# Patient Record
Sex: MALE | Race: WHITE | Employment: FULL TIME | URBAN - METROPOLITAN AREA
[De-identification: names, ages, dates, MRNs, and addresses within clinical notes are randomized per-mention and may not be internally consistent; named-entity substitution may affect disease eponyms.]

---

## 2019-07-15 NOTE — TELEPHONE ENCOUNTER
FUTURE VISIT INFORMATION      FUTURE VISIT INFORMATION:    Date: 7.16.19    Time: 3:00 pm    Location:  DermSurg  REFERRAL INFORMATION:    Referring provider:  Dr. Wilfrido Cordero    Referring providers clinic:  [facility]    Reason for visit/diagnosis:  SCCx1 left cheek    RECORDS REQUESTED FROM:       Clinic name Comments Records Status Imaging Status   [facility] 6.26.19 - Dr. Lai In [facility]

## 2019-07-16 ENCOUNTER — OFFICE VISIT (OUTPATIENT)
Dept: DERMATOLOGY | Facility: CLINIC | Age: 44
End: 2019-07-16
Payer: COMMERCIAL

## 2019-07-16 ENCOUNTER — PRE VISIT (OUTPATIENT)
Dept: DERMATOLOGY | Facility: CLINIC | Age: 44
End: 2019-07-16

## 2019-07-16 DIAGNOSIS — D04.30 SQUAMOUS CELL CARCINOMA IN SITU (SCCIS) OF SKIN OF FACE: Primary | ICD-10-CM

## 2019-07-16 ASSESSMENT — PAIN SCALES - GENERAL: PAINLEVEL: NO PAIN (0)

## 2019-07-16 NOTE — PROGRESS NOTES
[facility] Dermatology Note      Dermatology Problem List:  1. SCC, invasive, left lateral lower eyelid MMS date pending    Encounter Date: Jul 16, 2019    CC:  Chief Complaint   Patient presents with     Derm Problem     L cheek SCC         History of Present Illness:  Mr. Marc Pozo is a 44 year old male who presents in consultation from Wilfrido Lai DO, from the [facility] for possible mohs micrographic surgery. Last fall he had a blackhead on the left cheek near the eye. He squeezed on it and he thinks it subsequently became infected. He was seen twice and treated with courses of antibiotics without improvement. He then saw Dr. Lai and had a biopsy showing squamous cell carcinoma with positive peripheral and deep margins. He is otherwise doing well. No additional complaints.       Past Medical History:   There is no problem list on file for this patient.    History reviewed. No pertinent past medical history.  History reviewed. No pertinent surgical history.    Social History:  Patient reports that he has been smoking.  He uses smokeless tobacco.    Family History:  History reviewed. No pertinent family history.    Medications:  No current outpatient medications on file.       No Known Allergies    Review of Systems:  -Constitutional: Otherwise feeling well today, in usual state of health.  -Skin: As above in HPI. No additional skin concerns.    Physical exam:  Vitals: There were no vitals taken for this visit.  GEN: This is a well developed, well-nourished male in no acute distress, in a pleasant mood.    SKIN: Focused examination of the face was performed.  -2 cm freely mobile keratotic tumor on the right lateral lower eyelid.   - No palpable adenopathy of the head and neck  -No other lesions of concern on areas examined.       Impression/Plan:  1. Invasive squamous cell carcinoma, right lateral lower eyelid    Risks, benefits, and process of Mohs micrographic  surgery were discussed including possibility of damage to surrounding anatomical structures and infection. Patient is comfortable proceeding with the surgery; consent form was obtained. He will be scheduled at his convenience.    He [place] away and may want to observe tia-orbital swelling afterwards to make sure he is ok to drive      CC River Lai MD  [facility]  [address] [place] [address] on close of this encounter.    Follow-up for MMS at his convenience        Photo placed into patients chart note for further reference.         Staff Involved:  Scribe/Staff      Scribe Disclosure:   I, Jonathan Davis, am serving as a scribe to document services personally performed by Dr. Andrew Villalta, based on data collection and the provider's statements to me.     Attending Attestation  I attest that the Scribe recorded the interview and exam that I personally performed.  I have reviewed the note and edited it as necessary.    Andrew Villalta M.D.  Professor  Director of Dermatologic Surgery  Department of Dermatology  [facility]

## 2019-07-16 NOTE — LETTER
7/16/2019       RE: Marc Pozo  [address]  [facility] [address]     Dear Colleague,    Thank you for referring your patient, Marc Pozo, to the [facility] DERMATOLOGIC SURGERY at [facility]. Please see a copy of my visit note below.    [facility] Dermatology Note      Dermatology Problem List:  1. SCC, invasive, left lateral lower eyelid MMS date pending    Encounter Date: Jul 16, 2019    CC:  Chief Complaint   Patient presents with     Derm Problem     L cheek SCC         History of Present Illness:  Mr. Marc Pozo is a 44 year old male who presents in consultation from Wilfrido Lai DO, from the [facility] for possible mohs micrographic surgery. Last fall he had a blackhead on the left cheek near the eye. He squeezed on it and he thinks it subsequently became infected. He was seen twice and treated with courses of antibiotics without improvement. He then saw Dr. Lai and had a biopsy showing squamous cell carcinoma with positive peripheral and deep margins. He is otherwise doing well. No additional complaints.       Past Medical History:   There is no problem list on file for this patient.    History reviewed. No pertinent past medical history.  History reviewed. No pertinent surgical history.    Social History:  Patient reports that he has been smoking.  He uses smokeless tobacco.    Family History:  History reviewed. No pertinent family history.    Medications:  No current outpatient medications on file.       No Known Allergies    Review of Systems:  -Constitutional: Otherwise feeling well today, in usual state of health.  -Skin: As above in HPI. No additional skin concerns.    Physical exam:  Vitals: There were no vitals taken for this visit.  GEN: This is a well developed, well-nourished male in no acute distress, in a pleasant mood.    SKIN: Focused examination of the face was performed.  -2 cm freely mobile keratotic tumor on  the right lateral lower eyelid.   - No palpable adenopathy of the head and neck  -No other lesions of concern on areas examined.       Impression/Plan:  1. Invasive squamous cell carcinoma, right lateral lower eyelid    Risks, benefits, and process of Mohs micrographic surgery were discussed including possibility of damage to surrounding anatomical structures and infection. Patient is comfortable proceeding with the surgery; consent form was obtained. He will be scheduled at his convenience.    He lives 75 miles away and may want to observe tia-orbital swelling afterwards to make sure he is ok to drive      CC River Lai MD  [facility]  [address] [place] [address] on close of this encounter.    Follow-up for MMS at his convenience        Photo placed into patients chart note for further reference.         Staff Involved:  Scribe/Staff      Scribe Disclosure:   I, Jonathan Davis, am serving as a scribe to document services personally performed by Dr. Andrew Villalta, based on data collection and the provider's statements to me.     Attending Attestation  I attest that the Scribe recorded the interview and exam that I personally performed.  I have reviewed the note and edited it as necessary.    Andrew Villalta M.D.  Professor  Director of Dermatologic Surgery  Department of Dermatology  [facility]

## 2019-07-16 NOTE — PATIENT INSTRUCTIONS
Mohs Cutaneous Micrographic Surgery Unit  Andrew Villalta MD      Pre-Surgical Instruction Sheet    1. Expect to be here majority of the morning.  The Mohs surgical procedure can be an extensive surgery requiring multiple stages. Each stage may take 1-2 hours.    ? You may eat breakfast  ? You may bring snacks or beverages with you  ? Take all normal medications morning of surgery -- unless otherwise indicated by your physician  ? If you have an artificial heart valve, or joint replacement within two years, we will prescribe an antibiotic. Please take one hour prior to surgery.    2. You will need a  to be with you if your surgical site is close to your eyes. You can get swelling/bruising immediately after surgery and will go home with a big bulky bandage that could obstruct your view of driving safely.    3. Wear comfortable clothing -- preferably a button down shirt or a loose fitted shirt. (to avoid pulling over pressure bandage off when you get home) If your surgery site is on your leg, try wearing loose fitted pants. If your surgery site is on your arm, try wearing a short-sleeve shirt.    4. Bring reading material or other items to help pass time. We do have internet access available if you own a laptop, iPad, etc.)    5. Women - do NOT wear make-up. We will be washing it off anyone needing surgery on the face    6. Do NOT stop blood thinning medication unless instructed to do so by your surgeon. This will include: Warfarin, Coumadin, Jantoven, Aspirin, Plavix and Pradaxa. If you are taking Warfarin or Coumadin, please have your INR blood test results sent to our office no more than 7 days prior to your surgery.     7. Tylenol is a good alternative to take for headaches and pain, and can be taken throughout your surgery and postoperative recovery.    8. If your surgery is on your trunk, arms, hands, legs, feet, fingernail or a toenail, please wash the area with Hibiclens, an over-the-counter antiseptic soap,  the night before and morning of your surgery.     If you have any questions, please feel free to contact us.    Phone numbers:  During business hours (M-F 8:00-4:30 p.m.)  [facility]:  [phone] - select option 1 for appt. desk  [phone] - select option 3 for nurse triage line  [facility]:   [phone] - goes straight to call center   ---------------------------------------------------------  Evenings/Weekends/Holidays  Hospital - [phone]   TTY for hearing cufmrbjr-[phone]  *Ask  to page dermatologist on-call  Emergency Uqzs-[phone]  TTY for hearing impaired- [phone]

## 2019-07-24 ENCOUNTER — TELEPHONE (OUTPATIENT)
Dept: DERMATOLOGY | Facility: CLINIC | Age: 44
End: 2019-07-24

## 2019-07-24 NOTE — TELEPHONE ENCOUNTER
Called to reschedule mohs appointment. Please schedule next week in mohs slot Monday-Thursday morning. Number provided.

## 2019-07-24 NOTE — TELEPHONE ENCOUNTER
[facility] Call Center    Phone Message    May a detailed message be left on voicemail: yes    Reason for Call: Pt had wrong day down for Mohs appointment. He thought it was for tomorrow, but it was for this morning. Please call back to reschedule.       Action Taken: Message routed to:  [facility] ([facility] Dermatology Clinic

## 2019-07-25 NOTE — TELEPHONE ENCOUNTER
[facility] Call Center    Phone Message    May a detailed message be left on voicemail: yes    Reason for Call: Other: Pt returned the call he missed from [place]. He said that if she would like to schedule him something for Monday or Tuesday morning and then give him a call back with the date and time, that would be great.     Action Taken: Message routed to:  [facility] Dermatology

## 2019-07-25 NOTE — TELEPHONE ENCOUNTER
[facility] Call Center    Phone Message    May a detailed message be left on voicemail: yes    Reason for Call: Other: Pt calling returning call to [place] for scheduling. Please follow up with pt when available.Thank you.     Action Taken: Message routed to:  [facility] Derm

## 2019-07-29 ENCOUNTER — OFFICE VISIT (OUTPATIENT)
Dept: DERMATOLOGY | Facility: CLINIC | Age: 44
End: 2019-07-29
Payer: COMMERCIAL

## 2019-07-29 VITALS — DIASTOLIC BLOOD PRESSURE: 95 MMHG | SYSTOLIC BLOOD PRESSURE: 158 MMHG | HEART RATE: 83 BPM

## 2019-07-29 DIAGNOSIS — C44.329 SQUAMOUS CELL CARCINOMA OF SKIN OF LEFT CHEEK: Primary | ICD-10-CM

## 2019-07-29 RX ORDER — ACETAMINOPHEN 500 MG
1000 TABLET ORAL ONCE
Status: COMPLETED | OUTPATIENT
Start: 2019-07-29 | End: 2019-07-29

## 2019-07-29 RX ORDER — TRAMADOL HYDROCHLORIDE 50 MG/1
50 TABLET ORAL EVERY 6 HOURS PRN
Qty: 10 TABLET | Refills: 0 | Status: SHIPPED | OUTPATIENT
Start: 2019-07-29 | End: 2019-08-01

## 2019-07-29 RX ADMIN — Medication 1000 MG: at 10:05

## 2019-07-29 ASSESSMENT — PAIN SCALES - GENERAL
PAINLEVEL: NO PAIN (0)
PAINLEVEL: NO PAIN (0)

## 2019-07-29 NOTE — LETTER
7/29/2019       RE: Marc Pozo  [address]  [facility] [address]     Dear Colleague,    Thank you for referring your patient, Marc Pozo, to the [facility] DERMATOLOGIC SURGERY at [facility]. Please see a copy of my visit note below.    [facility] Mohs Dermatologic Surgery Procedure Note    Dermatology Surgery Clinic  [facility]  [facility]  [address] [place] [id number]    Date of Service:  Jul 29, 2019  Surgery: Mohs micrographic surgery    Primary Surgeon: Ambar  Assistant Surgeon: Serafin    Case 1  Repair Type: complex  Repair Size: 4.0 cm  Suture Material: Monocryl 4-0; Fast Absorbing Gut 5-0  Tumor Type: SCC - Squamous cell carcinoma  Location: Left cheek  Derm-Path Accession #: [id number]  PreOp Size: 2x1.8 cm  PostOp Size: 2.5x2 cm  Mohs Accession #:  [id number]  Level of Defect: muscle    Procedure:  We discussed the principles of treatment and most likely complications including scarring, bleeding, infection, swelling, pain, crusting, nerve damage, large wound,  incomplete excision, wound dehiscence,  nerve damage, recurrence, and a second procedure may be recommended to obtain the best cosmetic or functional result.    Informed consent was obtained and the patient underwent the procedure as follows:  The patient was placed supine on the operating table.  The cancer was identified, outlined with a marker, and verified by the patient.  The entire surgical field was prepped with Hibiclens.  The surgical site was anesthetized using Lidocaine 1% with epi 1:100,000.    The area of clinically apparent tumor was not debulked. The layer of tissue was then surgically excised using a #15 blade and was then transferred onto a specimen sheet maintaining the orientation of the specimen. Hemostasis was obtained using heat cautery. The wound site was then covered with a dressing while the tissue samples were processed for  examination.    The excised tissue was transported to the Mohs histology laboratory maintaining the tissue orientation.  The tissue specimen was relaxed so that the entire surgical margin was in a a single horizontal plane for sectioning and inked for precise mapping.  A precise reference map was drawn to reflect the sectioning of the specimen, colored inking of the margins, and orientation on the patient. The tissue was processed using horizontal sectioning of the base and continuous peripheral margins.  The histopathologic sections were reviewed in conjunction with the reference map.    Total blocks: 1    Total slides:  3    There were no cancer cells visualized on examination, therefore Mohs surgery was complete.    REPAIR: Complex Linear Closure    The patient was taken to the operative suite and placed supine on the operating room table.  The defect was identified.  Appropriate markings were made with a marking pen to plan the repair.  The area was infiltrated with Lidocaine 1% with epi 1:100,000and prepped with Hibiclens and draped with sterile towels.     The wound was debeveled and undermined widely.  Cones were excised within relaxed skin tension lines on both sides of the defect.  Hemostasis was obtained using electrocoagulation. The wound was narrowed with SMAS placation and subcutaneous tissue were then approximated using 4-0 Monocryl buried vertical mattress sutures.  The wound edges were then approximated additional buried sutures were placed in a similar fashion where needed.  Percutaneous simple running 5-0 fast absorbing gut sutures were carefully placed for maximum eversion and meticulous approximation.    Repair Size: 4.0cm    The wound was cleansed with saline and ointment was applied along the wound surface. A sterile pressure dressing was applied.  Wound care instructions were given verbally and in writing.  The patient left the operating suite in stable condition.  Patient was informed that  additional refinement of the resulting surgical scar may be used as a second stage of this reconstruction.     The Attending surgeon was present for key portions of the repair and always immediately available.                    Photo placed into patients chart note for further reference.       Staff Involved:    Scribe Disclosure  I, Arnulfo Luna, am serving as a scribe to document services personally performed by Dr. Andrew Villalta, based on data collection and the provider's statements to me.       Attending attestation:  I was present for key elements of the procedure and immediately available for all other portions of the procedure.  I have reviewed the note and edited it as necessary.  Dr. Betancourt performed the surgery under my supervision.  Andrew Villalta M.D.  Professor  Director of Dermatologic Surgery  Department of Dermatology  [facility]    Dermatology Surgery Clinic  [facility] [place] [phone]

## 2019-07-29 NOTE — PATIENT INSTRUCTIONS
Wound Care Instructions  Possible complications of any surgical procedure are bleeding, infection, scarring, alteration in skin color and sensation, muscle weakness in the area, wound dehiscence or seperation, or recurrence of the lesion or disease. On occasion, after healing, a secondary procedure or revision may be recommended in order to obtain the best cosmetic or functional result. This is usually discussed at your 3 month scar evaluation appointment or at you request after 3 months of healing.   After your surgery, a pressure bandage will be placed over the area that has sutures. This will help prevent bleeding, please leave this dressing on for 48 hours and do not get the dressing wet.   For the First 48 hours After Surgery:  1. Leave the pressure bandage on and keep it dry. If it should come loose, you may retape it, but do not take it off.  2. Relax and take it easy. Do not do any vigorous exercise, heavy lifting, or bending forward. This could cause the wound to bleed.  3. Post-operative pain is usually mild. You may take plain or extra strength Tylenol every 4 hours as needed (do not take more than 4,000mg in one day). Do not take any medicine that contains aspirin, ibuprofen or motrin unless you have been recommended these by a doctor.  Avoid alcohol and vitamin E as these may increase your tendency to bleed.  4. You may put an ice pack around the bandaged area for 20 minutes every 2-3 hours. This may help reduce swelling, bruising, and pain. Make sure the ice pack is waterproof so that the pressure bandage does not get wet.   5. You may see a small amount of drainage or blood on your pressure bandage. This is normal. However, if drainage or bleeding continues or saturates the bandage, you will need to apply firm pressure over the bandage with a washcloth for 15 minutes with out taking the pressure off. If bleeding continues after applying pressure for 15 minutes then go to the nearest emergency  room.  48 Hours After Surgery  Carefully remove the bandage and start daily wound care and dressing changes. You may also now shower and get the wound wet. Wash wound with a mild soap and water and rinse.  Use caution when washing the wound. Be gentle and do not let the forceful shower stream hit the wound directly.  PAT dry, do not rub as this could pull on stiches causing them to come loose or the site to bleed.   Daily Wound Care:  1. Wash wound with a mild soap and water and then rinse.  Use caution when washing the wound, be gentle and do not let the forceful shower stream hit the wound directly.  2. PAT DRY, do not rub.  3. Apply Vaseline (from a new container or tube) over the suture line with a Q-tip. It is very important to keep the wound continuously moist, as wounds heal best in a moist environment.  4.  Keep the site covered until stitches dissolve in one week, you can cover it with a Telfa (non-stick) dressing and tape or a band-aid.    5. If you are unable to keep wound covered, you must apply Vaseline every 2 - 3 hours (while awake) to ensure it is being kept moist for optimal healing. A dressing overnight is recommended to keep the area moist.   Call Us If:  1. You have pain that is not controlled with Tylenol.  2. You have signs or symptoms of an infection, such as: fever over 100 degrees F, redness, warmth, or foul-smelling or yellow/creamy drainage from the wound.  Who should I call with questions?    [facility]: [phone]     [facility]: [phone]    For urgent needs outside of business hours call the [facility] at [phone] and ask for the dermatology resident on call

## 2019-07-29 NOTE — NURSING NOTE
Vaseline and telfa applied. Pressure dressing applied with dental rolls and tape. No bleeding noted. Denies pain.  Wound care instructions reviewed. Supplies given. All questions answered.     Stephany Garza LPN

## 2019-07-29 NOTE — NURSING NOTE
Chief Complaint   Patient presents with     Derm Problem     Patient is here today for Mohs on left cheek SCC.     Cinthia Goodwin CMA

## 2019-07-29 NOTE — PROGRESS NOTES
[facility] Mohs Dermatologic Surgery Procedure Note    Dermatology Surgery Clinic  [facility]  Clinics and Surgery Center  [address] [place] [id number]    Date of Service:  Jul 29, 2019  Surgery: Mohs micrographic surgery    Primary Surgeon: Ambar  Assistant Surgeon: Serafin    Case 1  Repair Type: complex  Repair Size: 4.0 cm  Suture Material: Monocryl 4-0; Fast Absorbing Gut 5-0  Tumor Type: SCC - Squamous cell carcinoma  Location: Left cheek  Derm-Path Accession #: [id number]  PreOp Size: 2x1.8 cm  PostOp Size: 2.5x2 cm  Mohs Accession #:  [id number]  Level of Defect: muscle    Procedure:  We discussed the principles of treatment and most likely complications including scarring, bleeding, infection, swelling, pain, crusting, nerve damage, large wound,  incomplete excision, wound dehiscence,  nerve damage, recurrence, and a second procedure may be recommended to obtain the best cosmetic or functional result.    Informed consent was obtained and the patient underwent the procedure as follows:  The patient was placed supine on the operating table.  The cancer was identified, outlined with a marker, and verified by the patient.  The entire surgical field was prepped with Hibiclens.  The surgical site was anesthetized using Lidocaine 1% with epi 1:100,000.    The area of clinically apparent tumor was not debulked. The layer of tissue was then surgically excised using a #15 blade and was then transferred onto a specimen sheet maintaining the orientation of the specimen. Hemostasis was obtained using heat cautery. The wound site was then covered with a dressing while the tissue samples were processed for examination.    The excised tissue was transported to the Mohs histology laboratory maintaining the tissue orientation.  The tissue specimen was relaxed so that the entire surgical margin was in a a single horizontal plane for sectioning and inked for precise mapping.  A  precise reference map was drawn to reflect the sectioning of the specimen, colored inking of the margins, and orientation on the patient. The tissue was processed using horizontal sectioning of the base and continuous peripheral margins.  The histopathologic sections were reviewed in conjunction with the reference map.    Total blocks: 1    Total slides:  3    There were no cancer cells visualized on examination, therefore Mohs surgery was complete.    REPAIR: Complex Linear Closure    The patient was taken to the operative suite and placed supine on the operating room table.  The defect was identified.  Appropriate markings were made with a marking pen to plan the repair.  The area was infiltrated with Lidocaine 1% with epi 1:100,000and prepped with Hibiclens and draped with sterile towels.     The wound was debeveled and undermined widely.  Cones were excised within relaxed skin tension lines on both sides of the defect.  Hemostasis was obtained using electrocoagulation. The wound was narrowed with SMAS placation and subcutaneous tissue were then approximated using 4-0 Monocryl buried vertical mattress sutures.  The wound edges were then approximated additional buried sutures were placed in a similar fashion where needed.  Percutaneous simple running 5-0 fast absorbing gut sutures were carefully placed for maximum eversion and meticulous approximation.    Repair Size: 4.0cm    The wound was cleansed with saline and ointment was applied along the wound surface. A sterile pressure dressing was applied.  Wound care instructions were given verbally and in writing.  The patient left the operating suite in stable condition.  Patient was informed that additional refinement of the resulting surgical scar may be used as a second stage of this reconstruction.     The Attending surgeon was present for key portions of the repair and always immediately available.                    Photo placed into patients chart note for  further reference.       Staff Involved:    Scribe Disclosure  I, Arnulfo Luna, am serving as a scribe to document services personally performed by Dr. Andrew Villalta, based on data collection and the provider's statements to me.       Attending attestation:  I was present for key elements of the procedure and immediately available for all other portions of the procedure.  I have reviewed the note and edited it as necessary.  Dr. Betancourt performed the surgery under my supervision.  Andrew Villalta M.D.  Professor  Director of Dermatologic Surgery  Department of Dermatology  [facility]    Dermatology Surgery Clinic  [facility]  [address] [place], [place] [id number]

## 2019-07-30 ENCOUNTER — TELEPHONE (OUTPATIENT)
Dept: DERMATOLOGY | Facility: CLINIC | Age: 44
End: 2019-07-30

## 2019-07-30 NOTE — TELEPHONE ENCOUNTER
Follow up call attempted following procedure with Dr. Villalta, patient did not answer. I left a message stating that I was calling to see how they are doing following the procedure and to call us at [phone] if they have any concerns or questions.     Cinthia Goodwin CMA

## (undated) RX ORDER — ACETAMINOPHEN 500 MG
TABLET ORAL
Status: DISPENSED
Start: 2019-07-29